# Patient Record
Sex: FEMALE | Race: WHITE | NOT HISPANIC OR LATINO | Employment: FULL TIME | URBAN - METROPOLITAN AREA
[De-identification: names, ages, dates, MRNs, and addresses within clinical notes are randomized per-mention and may not be internally consistent; named-entity substitution may affect disease eponyms.]

---

## 2017-06-20 ENCOUNTER — TELEPHONE (OUTPATIENT)
Dept: OBSTETRICS AND GYNECOLOGY | Facility: CLINIC | Age: 57
End: 2017-06-20

## 2017-06-20 DIAGNOSIS — R10.2 PELVIC PAIN IN FEMALE: Primary | ICD-10-CM

## 2017-06-20 NOTE — TELEPHONE ENCOUNTER
Dr. Mitchell-- pt is experiencing pain that she thinks is coming from her ovaries. Pt's # [phone]

## 2017-06-23 ENCOUNTER — TELEPHONE (OUTPATIENT)
Dept: OBSTETRICS AND GYNECOLOGY | Facility: CLINIC | Age: 57
End: 2017-06-23

## 2017-06-26 ENCOUNTER — OFFICE VISIT (OUTPATIENT)
Dept: OBSTETRICS AND GYNECOLOGY | Facility: CLINIC | Age: 57
End: 2017-06-26
Attending: OBSTETRICS & GYNECOLOGY
Payer: COMMERCIAL

## 2017-06-26 VITALS
HEIGHT: 66 IN | BODY MASS INDEX: 23.24 KG/M2 | DIASTOLIC BLOOD PRESSURE: 84 MMHG | WEIGHT: 144.63 LBS | SYSTOLIC BLOOD PRESSURE: 136 MMHG

## 2017-06-26 DIAGNOSIS — Z00.00 PERIODIC HEALTH ASSESSMENT, GENERAL SCREENING, ADULT: ICD-10-CM

## 2017-06-26 DIAGNOSIS — Z12.11 SCREEN FOR COLON CANCER: ICD-10-CM

## 2017-06-26 DIAGNOSIS — R10.32 LLQ PAIN: Primary | ICD-10-CM

## 2017-06-26 LAB
CTP QC/QA: YES
FECAL OCCULT BLOOD, POC: NEGATIVE

## 2017-06-26 PROCEDURE — 82270 OCCULT BLOOD FECES: CPT | Mod: S$GLB,,, | Performed by: OBSTETRICS & GYNECOLOGY

## 2017-06-26 PROCEDURE — 99214 OFFICE O/P EST MOD 30 MIN: CPT | Mod: S$GLB,,, | Performed by: OBSTETRICS & GYNECOLOGY

## 2017-06-26 PROCEDURE — 99999 PR PBB SHADOW E&M-EST. PATIENT-LVL III: CPT | Mod: PBBFAC,,, | Performed by: OBSTETRICS & GYNECOLOGY

## 2017-06-26 RX ORDER — VARENICLINE TARTRATE 0.5 (11)-1
KIT ORAL
COMMUNITY
Start: 2016-02-17 | End: 2017-07-25

## 2017-06-26 NOTE — PROGRESS NOTES
Pt is 57 y.o. here for evaluation of pelvic pain. The pain is described as cramping, and is 4/10 in intensity when it comes. Pain is located in the LLQ area without radiation. Onset was gradual occurring 4 weeks ago. Symptoms have been gradually improving since. Aggravating factors: none. Alleviating factors: none. Associated symptoms: none. The patient denies constipation, diarrhea, fever, hematochezia, hematuria, nausea and vomiting. Risk factors for pelvic/abdominal pain include prior pelvic surgery. No LMP recorded. Patient is postmenopausal.  Occasionally sexually active, last time 4 months ago.   and no condoms.  Monogamous.  No change in physical activity.    Transabdominal and transvaginal pelvic ultrasound (17):  he uterus is normal in size measuring 7.6 x 3.4 x 3.7 cm.  The endometrial stripe does not appear thickened measuring 3 mm.  No abnormal uterine masses are identified.  The ovaries were not visualized on either side.  No free fluid is identified within the pelvis.   Impression       Unremarkable sonographic appearance of the uterus.  The ovaries were not visualized on either side.  No abnormal pelvic masses are appreciated.         OB History    Para Term  AB Living   1 1 1         SAB TAB Ectopic Multiple Live Births                  # Outcome Date GA Lbr Gómez/2nd Weight Sex Delivery Anes PTL Lv   1 Term  40w0d  4.649 kg (10 lb 4 oz) M CS-Unspec           History reviewed. No pertinent past medical history.  History reviewed. No pertinent surgical history.  Family History   Problem Relation Age of Onset    Breast cancer Paternal Grandmother 65    Coronary artery disease Father     Hyperlipidemia Father     Thyroid disease Sister     Thyroid disease Sister     Ovarian cancer Paternal Aunt 65    Colon cancer Neg Hx     Diabetes Neg Hx     Hypertension Neg Hx     Stroke Neg Hx      Social History     Social History    Marital status:      Spouse name:  "N/A    Number of children: N/A    Years of education: N/A     Social History Main Topics    Smoking status: Current Every Day Smoker    Smokeless tobacco: Never Used    Alcohol use No    Drug use: No    Sexual activity: Not Currently     Birth control/ protection: Post-menopausal     Other Topics Concern    None     Social History Narrative    None     Review of patient's allergies indicates:  No Known Allergies  Current Outpatient Prescriptions on File Prior to Visit   Medication Sig Dispense Refill    [DISCONTINUED] azelastine (ASTELIN) 137 mcg nasal spray 1 spray (137 mcg total) by Nasal route 2 (two) times daily. 30 mL 11     No current facility-administered medications on file prior to visit.        Review of Systems:  General: No fever, chills, fatigue or weight loss.  Chest: No chest pain, shortness of breath, or palpitations.  Breast: No pain, masses, or nipple discharge.  Vulva: No pain, lesions, or itching.  Vagina: No relaxation, pain, itching, discharge, or lesions.  Abdomen: No nausea, vomiting, diarrhea, or constipation.  Urinary: No incontinence, nocturia, frequency, or dysuria.  Extremities:  No leg cramps, edema, or calf pain.  Neurologic: No headaches, dizziness, or visual changes.    Vitals:  Vitals:    06/26/17 0957   BP: 136/84   Weight: 65.6 kg (144 lb 10 oz)   Height: 5' 6" (1.676 m)   PainSc: 0-No pain     Body mass index is 23.34 kg/m².    Physical Exam:  General:  Well developed, well nourished, and in no apparent distress.  HEENT:  Normal sclera.  No thyromegaly.  Abdomen:  Soft, nontender, nondistended, positive bowel sounds, no rebound or guarding.  External genitalia:  No lesions, erythema, rash, or other abnormalities.  Urethra:  No lesions or discharge.   Vagina:  No lesions, discharge, or tenderness.  Cervix:  No lesions, discharge, or cervical motion tenderness.   Uterus:  Normal in size and shape.  Mobile and nontender.   Adnexa:  No masses or tenderness.  Rectal:  No " mass.  Heme negative    Assessment and Plan:  LLQ pain  -     Ambulatory Referral to Gastroenterology  -     POCT OCCULT BLOOD STOOL    Screen for colon cancer  -     POCT OCCULT BLOOD STOOL    Periodic health assessment, general screening, adult  -     Ambulatory Referral to Internal Medicine      U/S and history reviewed with patient.  Differential diagnosis discussed.  No ovarian origin.  Follow-up for annual exam and to discuss pain in 1 month.  She says she doesn't need pain medication because it comes and goes and is not bad right now.

## 2017-07-11 ENCOUNTER — OFFICE VISIT (OUTPATIENT)
Dept: GASTROENTEROLOGY | Facility: CLINIC | Age: 57
End: 2017-07-11
Payer: COMMERCIAL

## 2017-07-11 ENCOUNTER — TELEPHONE (OUTPATIENT)
Dept: ENDOSCOPY | Facility: HOSPITAL | Age: 57
End: 2017-07-11

## 2017-07-11 VITALS
DIASTOLIC BLOOD PRESSURE: 96 MMHG | BODY MASS INDEX: 23.13 KG/M2 | SYSTOLIC BLOOD PRESSURE: 146 MMHG | HEART RATE: 79 BPM | WEIGHT: 143.94 LBS | HEIGHT: 66 IN

## 2017-07-11 DIAGNOSIS — Z12.11 SPECIAL SCREENING FOR MALIGNANT NEOPLASMS, COLON: Primary | ICD-10-CM

## 2017-07-11 DIAGNOSIS — Z12.11 COLON CANCER SCREENING: Primary | ICD-10-CM

## 2017-07-11 PROCEDURE — 99202 OFFICE O/P NEW SF 15 MIN: CPT | Mod: S$GLB,,, | Performed by: PHYSICIAN ASSISTANT

## 2017-07-11 PROCEDURE — 99999 PR PBB SHADOW E&M-EST. PATIENT-LVL III: CPT | Mod: PBBFAC,,, | Performed by: PHYSICIAN ASSISTANT

## 2017-07-11 RX ORDER — POLYETHYLENE GLYCOL 3350, SODIUM SULFATE ANHYDROUS, SODIUM BICARBONATE, SODIUM CHLORIDE, POTASSIUM CHLORIDE 236; 22.74; 6.74; 5.86; 2.97 G/4L; G/4L; G/4L; G/4L; G/4L
4 POWDER, FOR SOLUTION ORAL ONCE
Qty: 4000 ML | Refills: 0 | Status: SHIPPED | OUTPATIENT
Start: 2017-07-11 | End: 2017-07-11

## 2017-07-11 NOTE — PROGRESS NOTES
[facility] Consultation Note    Reason for Consult:  The encounter diagnosis was Colon cancer screening.    PCP:   Primary Doctor No       Referring MD:  Aaareferral Self  No address on file    HPI:  This is a 57 y.o. female here to schedule a colonoscopy  Today she has no complaints. She denies abdominal pain, nausea, vomiting, GERD, dysphagia, constipation, diarrhea, BRBPR, or melena. No prior colonoscopy.    Recently seen by her GYN for mild LLQ pain for  Today she denies abdominal pain and since her office GYN visit. F/u pelvic US was normal.  Referral was made by her GYN for LLQ pain.     ROS:  Constitutional: No fevers, chills, No weight loss  ENT: No allergies  CV: No chest pain  Pulm: No cough, No shortness of breath  Ophtho: No vision changes  GI: see HPI  Derm: No rash  Heme: No lymphadenopathy, No bruising  MSK: No arthritis  : No dysuria, No hematuria  Endo: No hot or cold intolerance  Neuro: No syncope, No seizure  Psych: No anxiety, No depression    Medical History:  has no past medical history on file.    Surgical History:  has a past surgical history that includes  section.    Family History: family history includes Breast cancer (age of onset: 65) in her paternal grandmother; Coronary artery disease in her father; Hyperlipidemia in her father; Ovarian cancer (age of onset: 65) in her paternal aunt; Thyroid disease in her sister and sister..     Social History:  reports that she has been smoking.  She has never used smokeless tobacco. She reports that she does not drink alcohol or use drugs.    Review of patient's allergies indicates:  No Known Allergies    Current Outpatient Prescriptions on File Prior to Visit   Medication Sig Dispense Refill    varenicline (CHANTIX JOSE) 0.5 mg (11)- 1 mg (42) tablet use as directed       No current facility-administered medications on file prior to visit.          Objective Findings:    Vital Signs:  BP (!) 146/96   Pulse 79    "Ht 5' 6" (1.676 m)   Wt 65.3 kg (143 lb 15.4 oz)   BMI 23.24 kg/m²   Body mass index is 23.24 kg/m².    Physical Exam:  General Appearance: Well appearing in no acute distress  Head:   Normocephalic, without obvious abnormality  Eyes:    No scleral icterus  ENT: Neck supple, Lips, mucosa, and tongue normal  Lungs: CTA bilaterally in anterior and posterior fields, no wheezes, no crackles.  Heart:  Regular rate and rhythm, S1, S2 normal, no murmurs heard  Abdomen: Soft, non tender, non distended with positive bowel sounds in all four quadrants. No hepatosplenomegaly, ascites, or mass  Extremities: 2+ pulses, no clubbing, cyanosis or edema  Skin: No rash  Neurologic: AAO x 3      Labs:  Lab Results   Component Value Date    WBC 5.97 02/18/2004    HGB 15.0 02/18/2004    HCT 44.2 02/18/2004     02/18/2004    CHOL 197 02/18/2004    TRIG 98 02/18/2004    HDL 49.0 02/18/2004    ALT 10 02/18/2004    AST 18 02/18/2004     02/18/2004    K 4.3 02/18/2004     02/18/2004    CREATININE 0.7 02/18/2004    BUN 12 02/18/2004    CO2 28 02/18/2004    TSH 0.64 02/18/2004       Imaging:    Endoscopy:      Assessment:  1. Colon cancer screening        Recommendations:  1. Schedule colonoscopy to rule out malignancies    I discussed with the pt the risk of perforation with colonoscopy after diverticulitis. I understand diverticulitis was never diagnosed, how mireille it is a possible source of LLQ pain. She stated that she understood this and again reinforced that she no longer has abdominal pain, and the pain she had was mild. No associated symptoms. She elected to proceed with scheduling the colonoscopy. Her insurance coverage is ending in a few weeks.    No Follow-up on file.      Order summary:  Orders Placed This Encounter    Case request GI: COLONOSCOPY         Thank you so much for allowing me to participate in the care of Catia Adrian Rushing PA-C        "

## 2017-07-12 ENCOUNTER — PATIENT OUTREACH (OUTPATIENT)
Dept: ADMINISTRATIVE | Facility: HOSPITAL | Age: 57
End: 2017-07-12

## 2017-07-20 ENCOUNTER — TELEPHONE (OUTPATIENT)
Dept: INTERNAL MEDICINE | Facility: CLINIC | Age: 57
End: 2017-07-20

## 2017-07-20 ENCOUNTER — TELEPHONE (OUTPATIENT)
Dept: OBSTETRICS AND GYNECOLOGY | Facility: CLINIC | Age: 57
End: 2017-07-20

## 2017-07-20 DIAGNOSIS — Z12.39 SCREENING FOR BREAST CANCER: Primary | ICD-10-CM

## 2017-07-20 NOTE — TELEPHONE ENCOUNTER
Dr Mitchell pt coming in on 7-31-17 for her annual but wants to get her mammo before the 31st cause she is losing her insurance.if possible please fax orders to [facility] and call pt either way if this can be done.Pt # [phone] or [phone]

## 2017-07-20 NOTE — TELEPHONE ENCOUNTER
Pt insurance will term on 7/31/17 and pt is requesting mammogram orders prior to her appointment with Dr. Mitchell. Informed pt that normal mammogram orders are no longer given out prior to appointment because Dr. Mitchell does a breast exam at time of visit and based off of that breast exam determines what orders are used. Advised pt that I will make an exception this once for her due to the situation but they may need additional images if these are not the correct orders needed for pt. Pt verbalized understanding. Orders created and faxed.

## 2017-07-20 NOTE — TELEPHONE ENCOUNTER
----- Message from Leena Case sent at 7/20/2017  9:29 AM CDT -----  _  1st Request  _  2nd Request  _  3rd Request        Who: patient    Why: pt called because she received a letter from your office to call and schedule some test before her appt on 7/25/17. Please call pt, also her insurance run out at the end month    What Number to Call Back: [phone] or [phone]    When to Expect a call back: (With in 24 hours)

## 2017-07-20 NOTE — TELEPHONE ENCOUNTER
Pt has appt on 7/25 to establish care. Please advise if pt needs any test or labs done before appt. Pt received message stating she needed a test or lab done before appt and I do not see any orders. Please advise and authorize.

## 2017-07-25 ENCOUNTER — OFFICE VISIT (OUTPATIENT)
Dept: INTERNAL MEDICINE | Facility: CLINIC | Age: 57
End: 2017-07-25
Payer: COMMERCIAL

## 2017-07-25 ENCOUNTER — LAB VISIT (OUTPATIENT)
Dept: LAB | Facility: OTHER | Age: 57
End: 2017-07-25
Attending: INTERNAL MEDICINE
Payer: COMMERCIAL

## 2017-07-25 VITALS
BODY MASS INDEX: 23.2 KG/M2 | OXYGEN SATURATION: 99 % | HEIGHT: 66 IN | HEART RATE: 70 BPM | DIASTOLIC BLOOD PRESSURE: 78 MMHG | WEIGHT: 144.38 LBS | SYSTOLIC BLOOD PRESSURE: 132 MMHG

## 2017-07-25 DIAGNOSIS — Z00.00 ANNUAL PHYSICAL EXAM: ICD-10-CM

## 2017-07-25 DIAGNOSIS — R03.0 PREHYPERTENSION: ICD-10-CM

## 2017-07-25 DIAGNOSIS — Z00.00 ANNUAL PHYSICAL EXAM: Primary | ICD-10-CM

## 2017-07-25 DIAGNOSIS — Z72.0 TOBACCO USE: ICD-10-CM

## 2017-07-25 LAB
ALBUMIN SERPL BCP-MCNC: 4.1 G/DL
ALP SERPL-CCNC: 103 U/L
ALT SERPL W/O P-5'-P-CCNC: 12 U/L
ANION GAP SERPL CALC-SCNC: 12 MMOL/L
AST SERPL-CCNC: 18 U/L
BASOPHILS # BLD AUTO: 0.02 K/UL
BASOPHILS NFR BLD: 0.6 %
BILIRUB SERPL-MCNC: 0.6 MG/DL
BUN SERPL-MCNC: 14 MG/DL
CALCIUM SERPL-MCNC: 9.9 MG/DL
CHLORIDE SERPL-SCNC: 103 MMOL/L
CHOLEST/HDLC SERPL: 4.8 {RATIO}
CO2 SERPL-SCNC: 25 MMOL/L
CREAT SERPL-MCNC: 0.7 MG/DL
DIFFERENTIAL METHOD: ABNORMAL
EOSINOPHIL # BLD AUTO: 0.1 K/UL
EOSINOPHIL NFR BLD: 1.8 %
ERYTHROCYTE [DISTWIDTH] IN BLOOD BY AUTOMATED COUNT: 12.6 %
EST. GFR  (AFRICAN AMERICAN): >60 ML/MIN/1.73 M^2
EST. GFR  (NON AFRICAN AMERICAN): >60 ML/MIN/1.73 M^2
GLUCOSE SERPL-MCNC: 98 MG/DL
HCT VFR BLD AUTO: 46 %
HCV AB SERPL QL IA: NEGATIVE
HDL/CHOLESTEROL RATIO: 21 %
HDLC SERPL-MCNC: 243 MG/DL
HDLC SERPL-MCNC: 51 MG/DL
HGB BLD-MCNC: 15.2 G/DL
LDLC SERPL CALC-MCNC: 169.4 MG/DL
LYMPHOCYTES # BLD AUTO: 1.2 K/UL
LYMPHOCYTES NFR BLD: 34.2 %
MCH RBC QN AUTO: 29.3 PG
MCHC RBC AUTO-ENTMCNC: 33 G/DL
MCV RBC AUTO: 89 FL
MONOCYTES # BLD AUTO: 0.3 K/UL
MONOCYTES NFR BLD: 8 %
NEUTROPHILS # BLD AUTO: 1.9 K/UL
NEUTROPHILS NFR BLD: 55.1 %
NONHDLC SERPL-MCNC: 192 MG/DL
PLATELET # BLD AUTO: 221 K/UL
PMV BLD AUTO: 11.7 FL
POTASSIUM SERPL-SCNC: 4.1 MMOL/L
PROT SERPL-MCNC: 7.6 G/DL
RBC # BLD AUTO: 5.19 M/UL
SODIUM SERPL-SCNC: 140 MMOL/L
TRIGL SERPL-MCNC: 113 MG/DL
TSH SERPL DL<=0.005 MIU/L-ACNC: 2.08 UIU/ML
WBC # BLD AUTO: 3.36 K/UL

## 2017-07-25 PROCEDURE — 90472 IMMUNIZATION ADMIN EACH ADD: CPT | Mod: S$GLB,,, | Performed by: INTERNAL MEDICINE

## 2017-07-25 PROCEDURE — 99386 PREV VISIT NEW AGE 40-64: CPT | Mod: 25,S$GLB,, | Performed by: INTERNAL MEDICINE

## 2017-07-25 PROCEDURE — 99999 PR PBB SHADOW E&M-EST. PATIENT-LVL IV: CPT | Mod: PBBFAC,,, | Performed by: INTERNAL MEDICINE

## 2017-07-25 PROCEDURE — 86803 HEPATITIS C AB TEST: CPT

## 2017-07-25 PROCEDURE — 90732 PPSV23 VACC 2 YRS+ SUBQ/IM: CPT | Mod: S$GLB,,, | Performed by: INTERNAL MEDICINE

## 2017-07-25 PROCEDURE — 90715 TDAP VACCINE 7 YRS/> IM: CPT | Mod: S$GLB,,, | Performed by: INTERNAL MEDICINE

## 2017-07-25 PROCEDURE — 80061 LIPID PANEL: CPT

## 2017-07-25 PROCEDURE — 90471 IMMUNIZATION ADMIN: CPT | Mod: S$GLB,,, | Performed by: INTERNAL MEDICINE

## 2017-07-25 PROCEDURE — 85025 COMPLETE CBC W/AUTO DIFF WBC: CPT

## 2017-07-25 PROCEDURE — 84443 ASSAY THYROID STIM HORMONE: CPT

## 2017-07-25 PROCEDURE — 83036 HEMOGLOBIN GLYCOSYLATED A1C: CPT

## 2017-07-25 PROCEDURE — 36415 COLL VENOUS BLD VENIPUNCTURE: CPT

## 2017-07-25 PROCEDURE — 80053 COMPREHEN METABOLIC PANEL: CPT

## 2017-07-25 NOTE — PROGRESS NOTES
Patient given TDAP IM in the LD. Patient tolerated well and Band-Aid was applied. Lot#[id number] Exp:04/11/2019. Patient advised to wait in the lobby for 15 min to make sure no adverse reactions occur. Patient given VIS information sheet.Patient states verbal understanding and has no further questions.Patient given Pneumovax 23 Im in the RD. Patient tolerated well and Band-Aid was applied. Lot#[id number] Exp:11/15/2018. Patient advised to wait in the lobby for 15 min to make sure no adverse reactions occur.Patient given VIS information sheet. Patient states verbal understanding and has no further questions.'

## 2017-07-25 NOTE — PROGRESS NOTES
"Subjective:       Patient ID: Catia Campbell is a 57 y.o. female.    Chief Complaint: Annual Exam      New pt.     Here to establish care.      Feeling well.  No c/o.      She is looking to quit smoking.  Quit in the past w/Chantix - approx 2 yrs ago.     She was laid off of her job 2 weeks ago.     She is active, such as with gardening, but does not exercise.           Review of Systems   Constitutional: Negative.    HENT: Negative.    Eyes: Negative.    Respiratory: Negative.        History reviewed. No pertinent past medical history.    Past Surgical History:   Procedure Laterality Date     SECTION         Family History   Problem Relation Age of Onset    Breast cancer Paternal Grandmother 65    Coronary artery disease Father     Hyperlipidemia Father     Heart disease Father     Cancer Mother     Thyroid disease Sister     Thyroid disease Sister     Ovarian cancer Paternal Aunt 65    Colon cancer Neg Hx     Diabetes Neg Hx     Hypertension Neg Hx     Stroke Neg Hx        Social History     Social History    Marital status:      Spouse name: N/A    Number of children: N/A    Years of education: N/A     Occupational History          Social History Main Topics    Smoking status: Current Every Day Smoker    Smokeless tobacco: Never Used    Alcohol use No    Drug use: No    Sexual activity: Not Currently     Birth control/ protection: Post-menopausal     Other Topics Concern    None     Social History Narrative    Lives w/.  Has one adult son.       Objective:       Vitals:    17 0927   BP: 132/78   BP Location: Left arm   Patient Position: Sitting   BP Method: Manual   Pulse: 70   SpO2: 99%   Weight: 65.5 kg (144 lb 6.4 oz)   Height: 5' 6" (1.676 m)     Physical Exam   Constitutional: She is oriented to person, place, and time. She appears well-developed and well-nourished.   HENT:   Head: Normocephalic and atraumatic.   Right Ear: Tympanic " membrane, external ear and ear canal normal.   Left Ear: Tympanic membrane, external ear and ear canal normal.   Mouth/Throat: Oropharynx is clear and moist. No oropharyngeal exudate or posterior oropharyngeal erythema.   Eyes: Conjunctivae and EOM are normal. Pupils are equal, round, and reactive to light.   Neck: Normal range of motion. Neck supple. No thyromegaly present.   Cardiovascular: Normal rate, regular rhythm and normal heart sounds.  Exam reveals no gallop and no friction rub.    No murmur heard.  Pulmonary/Chest: Effort normal and breath sounds normal. She has no wheezes. She has no rales.   Abdominal: Soft. Bowel sounds are normal. She exhibits no distension. There is no tenderness. There is no rebound and no guarding.   Musculoskeletal: Normal range of motion. She exhibits no edema.   Lymphadenopathy:     She has no cervical adenopathy.   Neurological: She is alert and oriented to person, place, and time. She has normal strength. She displays no atrophy and no tremor. No sensory deficit. She exhibits normal muscle tone. Gait normal.   Skin: Skin is warm and dry. No rash noted.   Psychiatric: She has a normal mood and affect. Her behavior is normal. Thought content normal.   Vitals reviewed.      Assessment:       1. Annual physical exam    2. Prehypertension    3. Tobacco use        Plan:           HM - Update labs.  Tdap today.      Elevated BP - Pt advised regarding lifestyle modifications.  Cont to monitor.      Tobacco - Pt advised to quit.  Pt aware of risks.  Pt is planning to quit and already has Chantix from another doctor.  Pt encouraged to follow through w/this.

## 2017-07-26 ENCOUNTER — TELEPHONE (OUTPATIENT)
Dept: INTERNAL MEDICINE | Facility: CLINIC | Age: 57
End: 2017-07-26

## 2017-07-26 DIAGNOSIS — D70.8 OTHER NEUTROPENIA: Primary | ICD-10-CM

## 2017-07-26 LAB
ESTIMATED AVG GLUCOSE: 105 MG/DL
HBA1C MFR BLD HPLC: 5.3 %

## 2017-07-27 NOTE — TELEPHONE ENCOUNTER
Inform pt of her lab results and that she needs to do repeat labs. Pt agrees and verbalize and has no further questions at this time. Pt also states that she got laid off on her job and she will no longer have insurance as of Monday and she will soon be added to her  insurance and will contact office back to schedule repeat labs.

## 2017-07-28 ENCOUNTER — HOSPITAL ENCOUNTER (OUTPATIENT)
Facility: HOSPITAL | Age: 57
Discharge: HOME | End: 2017-07-28
Attending: INTERNAL MEDICINE | Admitting: INTERNAL MEDICINE
Payer: COMMERCIAL

## 2017-07-28 ENCOUNTER — SURGERY (OUTPATIENT)
Age: 57
End: 2017-07-28

## 2017-07-28 ENCOUNTER — ANESTHESIA EVENT (OUTPATIENT)
Dept: ENDOSCOPY | Facility: HOSPITAL | Age: 57
End: 2017-07-28
Payer: COMMERCIAL

## 2017-07-28 ENCOUNTER — ANESTHESIA (OUTPATIENT)
Dept: ENDOSCOPY | Facility: HOSPITAL | Age: 57
End: 2017-07-28
Payer: COMMERCIAL

## 2017-07-28 VITALS
HEART RATE: 60 BPM | DIASTOLIC BLOOD PRESSURE: 79 MMHG | SYSTOLIC BLOOD PRESSURE: 118 MMHG | OXYGEN SATURATION: 96 % | WEIGHT: 140 LBS | HEIGHT: 66 IN | BODY MASS INDEX: 22.5 KG/M2 | TEMPERATURE: 98 F | RESPIRATION RATE: 18 BRPM

## 2017-07-28 DIAGNOSIS — Z12.11 COLON CANCER SCREENING: Primary | ICD-10-CM

## 2017-07-28 DIAGNOSIS — Z12.11 SCREENING FOR COLON CANCER: ICD-10-CM

## 2017-07-28 PROCEDURE — 37000008 HC ANESTHESIA 1ST 15 MINUTES: Performed by: INTERNAL MEDICINE

## 2017-07-28 PROCEDURE — 37000009 HC ANESTHESIA EA ADD 15 MINS: Performed by: INTERNAL MEDICINE

## 2017-07-28 PROCEDURE — 45380 COLONOSCOPY AND BIOPSY: CPT | Mod: 33,,, | Performed by: INTERNAL MEDICINE

## 2017-07-28 PROCEDURE — D9220A PRA ANESTHESIA: Mod: 33,ANES,, | Performed by: ANESTHESIOLOGY

## 2017-07-28 PROCEDURE — 63600175 PHARM REV CODE 636 W HCPCS: Performed by: NURSE ANESTHETIST, CERTIFIED REGISTERED

## 2017-07-28 PROCEDURE — 88305 TISSUE EXAM BY PATHOLOGIST: CPT | Mod: 26,,, | Performed by: PATHOLOGY

## 2017-07-28 PROCEDURE — 45380 COLONOSCOPY AND BIOPSY: CPT | Performed by: INTERNAL MEDICINE

## 2017-07-28 PROCEDURE — D9220A PRA ANESTHESIA: Mod: 33,CRNA,, | Performed by: NURSE ANESTHETIST, CERTIFIED REGISTERED

## 2017-07-28 PROCEDURE — 25000003 PHARM REV CODE 250: Performed by: NURSE ANESTHETIST, CERTIFIED REGISTERED

## 2017-07-28 PROCEDURE — 27201012 HC FORCEPS, HOT/COLD, DISP: Performed by: INTERNAL MEDICINE

## 2017-07-28 PROCEDURE — 25000003 PHARM REV CODE 250: Performed by: INTERNAL MEDICINE

## 2017-07-28 PROCEDURE — 88305 TISSUE EXAM BY PATHOLOGIST: CPT | Performed by: PATHOLOGY

## 2017-07-28 RX ORDER — PROPOFOL 10 MG/ML
VIAL (ML) INTRAVENOUS
Status: DISCONTINUED | OUTPATIENT
Start: 2017-07-28 | End: 2017-07-28

## 2017-07-28 RX ORDER — PROPOFOL 10 MG/ML
VIAL (ML) INTRAVENOUS CONTINUOUS PRN
Status: DISCONTINUED | OUTPATIENT
Start: 2017-07-28 | End: 2017-07-28

## 2017-07-28 RX ORDER — SODIUM CHLORIDE 9 MG/ML
INJECTION, SOLUTION INTRAVENOUS CONTINUOUS PRN
Status: DISCONTINUED | OUTPATIENT
Start: 2017-07-28 | End: 2017-07-28

## 2017-07-28 RX ORDER — LIDOCAINE HYDROCHLORIDE 10 MG/ML
INJECTION, SOLUTION INTRAVENOUS
Status: DISCONTINUED | OUTPATIENT
Start: 2017-07-28 | End: 2017-07-28

## 2017-07-28 RX ORDER — SODIUM CHLORIDE 9 MG/ML
INJECTION, SOLUTION INTRAVENOUS CONTINUOUS
Status: DISCONTINUED | OUTPATIENT
Start: 2017-07-28 | End: 2017-07-28 | Stop reason: HOSPADM

## 2017-07-28 RX ADMIN — LIDOCAINE HYDROCHLORIDE 100 MG: 10 INJECTION, SOLUTION INTRAVENOUS at 01:07

## 2017-07-28 RX ADMIN — SODIUM CHLORIDE: 0.9 INJECTION, SOLUTION INTRAVENOUS at 01:07

## 2017-07-28 RX ADMIN — PROPOFOL 60 MG: 10 INJECTION, EMULSION INTRAVENOUS at 01:07

## 2017-07-28 RX ADMIN — PROPOFOL 150 MCG/KG/MIN: 10 INJECTION, EMULSION INTRAVENOUS at 01:07

## 2017-07-28 NOTE — PLAN OF CARE
Discharge instructions given to pt and pts . Both verbalize understanding. No questions at this time.

## 2017-07-28 NOTE — TRANSFER OF CARE
"Anesthesia Transfer of Care Note    Patient: Catia Campbell    Procedure(s) Performed: Procedure(s) (LRB):  COLONOSCOPY (N/A)    Patient location: PACU    Anesthesia Type: general    Transport from OR: Transported from OR on room air with adequate spontaneous ventilation    Post pain: adequate analgesia    Post assessment: no apparent anesthetic complications and tolerated procedure well    Post vital signs: stable    Level of consciousness: awake and alert    Nausea/Vomiting: no nausea/vomiting    Complications: none    Transfer of care protocol was followed      Last vitals:   Visit Vitals  BP (!) 106/59 (BP Location: Left arm, Patient Position: Lying, BP Method: Automatic)   Pulse 69   Temp 36.7 °C (98 °F) (Axillary)   Resp 16   Ht 5' 6" (1.676 m)   Wt 63.5 kg (140 lb)   SpO2 95%   Breastfeeding? No   BMI 22.60 kg/m²     "

## 2017-07-28 NOTE — ANESTHESIA PREPROCEDURE EVALUATION
07/28/2017  Catia Campbell is a 57 y.o., female.    Anesthesia Evaluation    I have reviewed the Patient Summary Reports.    I have reviewed the Nursing Notes.      Review of Systems  Anesthesia Hx:  No problems with previous Anesthesia    Social:  Smoker    Cardiovascular:  Cardiovascular Normal     Pulmonary:  Pulmonary Normal        Physical Exam  General:  Well nourished    Airway/Jaw/Neck:  Airway Findings: Mouth Opening: Normal Tongue: Normal  Mallampati: II  TM Distance: Normal, at least 6 cm  Jaw/Neck Findings:  Neck ROM: Normal ROM     Eyes/Ears/Nose:  Eyes/Ears/Nose Findings:    Dental:  Dental Findings:   Chest/Lungs:  Chest/Lungs Findings: Normal Respiratory Rate     Heart/Vascular:  Heart Findings: Rate: Normal  Rhythm: Regular Rhythm        Mental Status:  Mental Status Findings:  Cooperative, Alert and Oriented         Anesthesia Plan  Type of Anesthesia, risks & benefits discussed:  Anesthesia Type:  general  Patient's Preference: general  Intra-op Monitoring Plan: standard ASA monitors  Intra-op Monitoring Plan Comments:   Post Op Pain Control Plan:   Post Op Pain Control Plan Comments:   Induction:   IV  Beta Blocker:  Patient is not currently on a Beta-Blocker (No further documentation required).       Informed Consent: Patient understands risks and agrees with Anesthesia plan.  Questions answered. Anesthesia consent signed with patient.  ASA Score: 2     Day of Surgery Review of History & Physical:    H&P update referred to the surgeon.         Ready For Surgery From Anesthesia Perspective.

## 2017-07-28 NOTE — ANESTHESIA POSTPROCEDURE EVALUATION
"Anesthesia Post Evaluation    Patient: Catia Campbell    Procedure(s) Performed: Procedure(s) (LRB):  COLONOSCOPY (N/A)    Final Anesthesia Type: general  Patient location during evaluation: GI PACU  Patient participation: Yes- Able to Participate  Level of consciousness: awake and alert, oriented and awake  Post-procedure vital signs: reviewed and stable  Pain management: adequate  Airway patency: patent  PONV status at discharge: No PONV  Anesthetic complications: no      Cardiovascular status: blood pressure returned to baseline and hemodynamically stable  Respiratory status: unassisted, spontaneous ventilation and room air  Hydration status: euvolemic  Follow-up not needed.        Visit Vitals  /79 (BP Location: Left arm, Patient Position: Lying, BP Method: Automatic)   Pulse 60   Temp 36.7 °C (98 °F) (Axillary)   Resp 18   Ht 5' 6" (1.676 m)   Wt 63.5 kg (140 lb)   SpO2 96%   Breastfeeding? No   BMI 22.60 kg/m²       Pain/Joselin Score: Pain Assessment Performed: Yes (7/28/2017  2:45 PM)  Presence of Pain: denies (7/28/2017  2:45 PM)  Joselin Score: 10 (7/28/2017  2:45 PM)      "

## 2017-07-28 NOTE — INTERVAL H&P NOTE
The patient has been examined and the H&P has been reviewed:    There is no interval changes since last encounter.  Colonoscopy: Screening for CRC  Sedation: GA  ASA: Per anesthesia  Mallampati: Per anesthesia    Endoscopy risks, benefits and alternative options discussed and understood by patient/family.          Active Hospital Problems    Diagnosis  POA    Screening for colon cancer [Z12.11]  Not Applicable      Resolved Hospital Problems    Diagnosis Date Resolved POA   No resolved problems to display.

## 2017-07-28 NOTE — H&P (VIEW-ONLY)
[facility] Consultation Note    Reason for Consult:  The encounter diagnosis was Colon cancer screening.    PCP:   Primary Doctor No       Referring MD:  Aaareferral Self  No address on file    HPI:  This is a 57 y.o. female here to schedule a colonoscopy  Today she has no complaints. She denies abdominal pain, nausea, vomiting, GERD, dysphagia, constipation, diarrhea, BRBPR, or melena. No prior colonoscopy.    Recently seen by her GYN for mild LLQ pain for  Today she denies abdominal pain and since her office GYN visit. F/u pelvic US was normal.  Referral was made by her GYN for LLQ pain.     ROS:  Constitutional: No fevers, chills, No weight loss  ENT: No allergies  CV: No chest pain  Pulm: No cough, No shortness of breath  Ophtho: No vision changes  GI: see HPI  Derm: No rash  Heme: No lymphadenopathy, No bruising  MSK: No arthritis  : No dysuria, No hematuria  Endo: No hot or cold intolerance  Neuro: No syncope, No seizure  Psych: No anxiety, No depression    Medical History:  has no past medical history on file.    Surgical History:  has a past surgical history that includes  section.    Family History: family history includes Breast cancer (age of onset: 65) in her paternal grandmother; Coronary artery disease in her father; Hyperlipidemia in her father; Ovarian cancer (age of onset: 65) in her paternal aunt; Thyroid disease in her sister and sister..     Social History:  reports that she has been smoking.  She has never used smokeless tobacco. She reports that she does not drink alcohol or use drugs.    Review of patient's allergies indicates:  No Known Allergies    Current Outpatient Prescriptions on File Prior to Visit   Medication Sig Dispense Refill    varenicline (CHANTIX JOSE) 0.5 mg (11)- 1 mg (42) tablet use as directed       No current facility-administered medications on file prior to visit.          Objective Findings:    Vital Signs:  BP (!) 146/96   Pulse 79    "Ht 5' 6" (1.676 m)   Wt 65.3 kg (143 lb 15.4 oz)   BMI 23.24 kg/m²   Body mass index is 23.24 kg/m².    Physical Exam:  General Appearance: Well appearing in no acute distress  Head:   Normocephalic, without obvious abnormality  Eyes:    No scleral icterus  ENT: Neck supple, Lips, mucosa, and tongue normal  Lungs: CTA bilaterally in anterior and posterior fields, no wheezes, no crackles.  Heart:  Regular rate and rhythm, S1, S2 normal, no murmurs heard  Abdomen: Soft, non tender, non distended with positive bowel sounds in all four quadrants. No hepatosplenomegaly, ascites, or mass  Extremities: 2+ pulses, no clubbing, cyanosis or edema  Skin: No rash  Neurologic: AAO x 3      Labs:  Lab Results   Component Value Date    WBC 5.97 02/18/2004    HGB 15.0 02/18/2004    HCT 44.2 02/18/2004     02/18/2004    CHOL 197 02/18/2004    TRIG 98 02/18/2004    HDL 49.0 02/18/2004    ALT 10 02/18/2004    AST 18 02/18/2004     02/18/2004    K 4.3 02/18/2004     02/18/2004    CREATININE 0.7 02/18/2004    BUN 12 02/18/2004    CO2 28 02/18/2004    TSH 0.64 02/18/2004       Imaging:    Endoscopy:      Assessment:  1. Colon cancer screening        Recommendations:  1. Schedule colonoscopy to rule out malignancies    I discussed with the pt the risk of perforation with colonoscopy after diverticulitis. I understand diverticulitis was never diagnosed, how mireille it is a possible source of LLQ pain. She stated that she understood this and again reinforced that she no longer has abdominal pain, and the pain she had was mild. No associated symptoms. She elected to proceed with scheduling the colonoscopy. Her insurance coverage is ending in a few weeks.    No Follow-up on file.      Order summary:  Orders Placed This Encounter    Case request GI: COLONOSCOPY         Thank you so much for allowing me to participate in the care of Actia Adrian Rushing PA-C        "

## 2017-07-28 NOTE — PATIENT INSTRUCTIONS
Discharge Summary/Instructions after an Endoscopic Procedure  Patient Name: Catia Campbell  Patient MRN: [id number]  Patient YOB: 1960 Friday, July 28, 2017  Meño Bustamante MD  RESTRICTIONS ON ACTIVITY:  - DO NOT drive a car, operate machinery, make legal/financial decisions, or   drink alcohol until the day after the procedure.    - The following day: return to full activity including work, except no heavy   lifting, straining or running for 3 days if polyps were removed.  - Diet: Eat and drink normally unless instructed otherwise.  TREATMENT FOR COMMON SIDE EFFECTS:  - Mild abdominal pain, bloating or excessive gas: rest, eat lightly and use   a heating pad.  - Sore Throat - treat with throat lozenges. Gargle with warm salt water.  SYMPTOMS TO WATCH FOR AND REPORT TO YOUR PHYSICIAN:  1. Severe abdominal pain or bloating.  2. Pain in chest.  3. Chills or fever occurring within 24 hours after a procedure.  4. A large amount of rectal bleeding, which would show as bright red,   maroon, or black stools. (A small amount of blood from the rectum is not   serious, especially if hemorrhoids are present.)  5. Because air was used during the procedure, expelling large amounts of air   from your rectum or belching is normal.  6. If a bowel prep was taken, you may not have a bowel movement for 1-3   days.  This is normal.  7. Go directly to the emergency room if you notice any of the following:   Chills and/or fever over 101 F   Persistent vomiting   Severe abdominal pain, other than gas cramps   Severe chest pain   Black, tarry stools   Any bleeding - exceeding one tablespoon  Your doctor recommends these additional instructions:  If any biopsies were performed, my office will call you in 5 to 6 business   days with any results.  You have a contact number available for emergencies.  The signs and symptoms   of potential delayed complications were discussed with you.  You may return   to normal activities  tomorrow.  Written discharge instructions were   provided to you.   You are being discharged to home.   Resume your previous diet.   Continue your present medications.   We are waiting for your pathology results.   Your physician has recommended a repeat colonoscopy in five years for   surveillance.  For questions, problems or results please call your physician - Meño Bustamante MD at Work:  [phone].  [facility], EMERGENCY ROOM PHONE NUMBER: [phone]  IF A COMPLICATION OR EMERGENCY SITUATION ARISES AND YOU ARE UNABLE TO REACH   YOUR PHYSICIAN - GO TO THE EMERGENCY ROOM.  Meño Bustamante MD  7/28/2017 2:20:21 PM  This report has been verified and signed electronically.

## 2017-07-31 ENCOUNTER — OFFICE VISIT (OUTPATIENT)
Dept: OBSTETRICS AND GYNECOLOGY | Facility: CLINIC | Age: 57
End: 2017-07-31
Attending: OBSTETRICS & GYNECOLOGY
Payer: COMMERCIAL

## 2017-07-31 ENCOUNTER — HOSPITAL ENCOUNTER (OUTPATIENT)
Dept: RADIOLOGY | Facility: OTHER | Age: 57
Discharge: HOME | End: 2017-07-31
Attending: OBSTETRICS & GYNECOLOGY
Payer: COMMERCIAL

## 2017-07-31 ENCOUNTER — TELEPHONE (OUTPATIENT)
Dept: OBSTETRICS AND GYNECOLOGY | Facility: CLINIC | Age: 57
End: 2017-07-31

## 2017-07-31 VITALS
SYSTOLIC BLOOD PRESSURE: 142 MMHG | WEIGHT: 143.88 LBS | BODY MASS INDEX: 23.12 KG/M2 | HEIGHT: 66 IN | DIASTOLIC BLOOD PRESSURE: 84 MMHG

## 2017-07-31 DIAGNOSIS — Z78.0 MENOPAUSE: ICD-10-CM

## 2017-07-31 DIAGNOSIS — Z72.0 TOBACCO USE: Primary | ICD-10-CM

## 2017-07-31 DIAGNOSIS — Z01.419 ENCOUNTER FOR GYNECOLOGICAL EXAMINATION: Primary | ICD-10-CM

## 2017-07-31 PROBLEM — R10.32 LLQ PAIN: Status: RESOLVED | Noted: 2017-06-26 | Resolved: 2017-07-31

## 2017-07-31 PROCEDURE — 99999 PR PBB SHADOW E&M-EST. PATIENT-LVL II: CPT | Mod: PBBFAC,,, | Performed by: OBSTETRICS & GYNECOLOGY

## 2017-07-31 PROCEDURE — 77080 DXA BONE DENSITY AXIAL: CPT | Mod: 26,,, | Performed by: RADIOLOGY

## 2017-07-31 PROCEDURE — 99396 PREV VISIT EST AGE 40-64: CPT | Mod: S$GLB,,, | Performed by: OBSTETRICS & GYNECOLOGY

## 2017-07-31 PROCEDURE — 77080 DXA BONE DENSITY AXIAL: CPT | Mod: TC

## 2017-07-31 RX ORDER — VARENICLINE TARTRATE 0.5 (11)-1
KIT ORAL
Qty: 1 PACKAGE | Refills: 0 | Status: SHIPPED | OUTPATIENT
Start: 2017-07-31 | End: 2018-12-14

## 2017-07-31 NOTE — TELEPHONE ENCOUNTER
Pt just saw dr gloria today and was told her Chantix would be sent in today insurance expires after today.Pt needs it to be sent in and please call pt when this is done. Pt # [phone] Jolly # [phone]

## 2017-07-31 NOTE — PROGRESS NOTES
Subjective:       Patient ID: Catia Campbell is a 57 y.o. female.    Chief Complaint:  Well Woman (last pap and HPV negative 1/21/15, last mammogram wnl 17, last DEXA Osteopenia T-Score: Spine -1.31, L hip -2.22 1/21/15, last colonoscopy 17- pt requesting Rx for Chantix)      History of Present Illness.  Catia Campbell is a 57 y.o. female.  She has no breast or urinary symptoms.  She has no postcoital bleeding, pelvic pain or vaginal discharge.    GYN & OB History  No LMP recorded. Patient is postmenopausal.   Pap: 1/21/15 Normal HPV negative  Mammogram: 17 Normal  Colonoscopy: 2017 1 polyp - Path pending  DEXA:  FRAX 13% and 2.2% Hip -2.22 Spine-1.31    OB History    Para Term  AB Living   1 1 1         SAB TAB Ectopic Multiple Live Births                  # Outcome Date GA Lbr Gómez/2nd Weight Sex Delivery Anes PTL Lv   1 Term 82 40w0d  4.649 kg (10 lb 4 oz) M CS-Unspec         Obstetric Comments   Age at menarche 14       Past Medical History:   Diagnosis Date    Fibrocystic breast disease     History of bone density study 2015    Osteopenia T-Score: Spine -1.31, L hip -2.22    Osteopenia      Past Surgical History:   Procedure Laterality Date     SECTION      COLONOSCOPY N/A 2017    Procedure: COLONOSCOPY;  Surgeon: Meño Bustamante MD;  Location: [address];  Service: Endoscopy;  Laterality: N/A;  PM prep     Family History   Problem Relation Age of Onset    Breast cancer Paternal Grandmother 65    Coronary artery disease Father     Hyperlipidemia Father     Heart disease Father     Cancer Mother     Thyroid disease Sister     Thyroid disease Sister     Ovarian cancer Paternal Aunt 65    Colon cancer Neg Hx     Diabetes Neg Hx     Hypertension Neg Hx     Stroke Neg Hx      Social History   Substance Use Topics    Smoking status: Current Every Day Smoker    Smokeless tobacco: Never Used    Alcohol use Yes      Comment:  "rare     No current outpatient prescriptions on file.    Review of patient's allergies indicates:  No Known Allergies    Review of Systems  Review of Systems   Constitutional: Negative for fatigue.   HENT: Negative for trouble swallowing.    Eyes: Negative for visual disturbance.   Respiratory: Negative for cough and shortness of breath.    Cardiovascular: Negative for chest pain.   Gastrointestinal: Negative for abdominal distention, abdominal pain, blood in stool, nausea and vomiting.   Genitourinary: Negative for difficulty urinating, dyspareunia, dysuria, flank pain, frequency, hematuria, pelvic pain, urgency, vaginal bleeding, vaginal discharge and vaginal pain.   Musculoskeletal: Negative for arthralgias.   Skin: Negative for rash.   Neurological: Negative for dizziness and headaches.   Psychiatric/Behavioral: Negative for sleep disturbance. The patient is not nervous/anxious.         Objective:     Vitals:    07/31/17 1002   BP: (!) 142/84   Weight: 65.2 kg (143 lb 13.6 oz)   Height: 5' 6" (1.676 m)   PainSc: 0-No pain     Body mass index is 23.22 kg/m².    Physical Exam:   Constitutional: She is oriented to person, place, and time. Vital signs are normal. She appears well-developed and well-nourished.    HENT:   Head: Normocephalic.     Neck: Normal range of motion. No thyromegaly present.     Pulmonary/Chest: Right breast exhibits no mass, no nipple discharge, no skin change, no tenderness and no swelling. Left breast exhibits no mass, no nipple discharge, no skin change, no tenderness and no swelling. Breasts are symmetrical.        Abdominal: Soft. Normal appearance and bowel sounds are normal. She exhibits no distension. There is no tenderness.     Genitourinary: Vagina normal and uterus normal. Pelvic exam was performed with patient supine. There is no rash, tenderness, lesion or injury on the right labia. There is no rash, tenderness, lesion or injury on the left labia. Cervix is normal. Right adnexum " displays no mass, no tenderness and no fullness. Left adnexum displays no mass, no tenderness and no fullness. No erythema in the vagina. No vaginal discharge found. Cervix exhibits no motion tenderness and no discharge.           Musculoskeletal: Normal range of motion.      Lymphadenopathy:        Right: No inguinal and no supraclavicular adenopathy present.        Left: No inguinal and no supraclavicular adenopathy present.    Neurological: She is alert and oriented to person, place, and time.    Skin: Skin is warm and dry.    Psychiatric: She has a normal mood and affect.        Assessment/ Plan:     Encounter for gynecological examination    Menopause  -     DXA Bone Density Spine And Hip; Future; Expected date: 07/31/2017      Smoking cessation discussed.    Routine pap smears.  Self breast exam and mammography discussed  Routine colonoscopy discussed.  Diet and exercise discussed.  Recommend calcium 1200 mg and vitamin D 600 units daily and routine bone mineral density testing.  Yearly influenza vaccination discussed.  Follow-up with me in 1 year.

## 2017-08-01 ENCOUNTER — TELEPHONE (OUTPATIENT)
Dept: GASTROENTEROLOGY | Facility: CLINIC | Age: 57
End: 2017-08-01

## 2017-08-01 NOTE — TELEPHONE ENCOUNTER
----- Message from Meño Bustamante MD sent at 8/1/2017  8:46 AM CDT -----  Please notify patient, the colon polyp was benign.

## 2017-08-03 ENCOUNTER — TELEPHONE (OUTPATIENT)
Dept: OBSTETRICS AND GYNECOLOGY | Facility: CLINIC | Age: 57
End: 2017-08-03

## 2017-08-03 ENCOUNTER — TELEPHONE (OUTPATIENT)
Dept: GASTROENTEROLOGY | Facility: CLINIC | Age: 57
End: 2017-08-03

## 2017-08-03 NOTE — TELEPHONE ENCOUNTER
----- Message from Mariposa Bartholomew sent at 8/2/2017  2:22 PM CDT -----  Contact: self - [phone]  Robby - returning your call re test results - please call patient at

## 2017-08-03 NOTE — TELEPHONE ENCOUNTER
----- Message from Melissa Mitchell MD sent at 7/31/2017 11:19 AM CDT -----  Please call patient. The bone density test shows osteopenia, which is thinning of the bones but not as severe as osteoporosis.  To improve the bone density, I recommend participating in weight-bearing exercise 2-3 times / week.  Also, please take calcium 1200 mg daily and Vitamin D 600 units daily unless your primary care physician has instructed you not to do so.   Repeat the bone density test in 3-5 years.

## 2017-08-04 ENCOUNTER — TELEPHONE (OUTPATIENT)
Dept: ENDOSCOPY | Facility: HOSPITAL | Age: 57
End: 2017-08-04

## 2018-12-14 ENCOUNTER — OFFICE VISIT (OUTPATIENT)
Dept: INTERNAL MEDICINE | Facility: CLINIC | Age: 58
End: 2018-12-14
Payer: COMMERCIAL

## 2018-12-14 VITALS
TEMPERATURE: 98 F | BODY MASS INDEX: 25.72 KG/M2 | SYSTOLIC BLOOD PRESSURE: 120 MMHG | HEART RATE: 84 BPM | WEIGHT: 160.06 LBS | HEIGHT: 66 IN | DIASTOLIC BLOOD PRESSURE: 88 MMHG

## 2018-12-14 DIAGNOSIS — J06.9 UPPER RESPIRATORY TRACT INFECTION, UNSPECIFIED TYPE: ICD-10-CM

## 2018-12-14 DIAGNOSIS — F17.200 TOBACCO DEPENDENCE: ICD-10-CM

## 2018-12-14 DIAGNOSIS — R68.89 FLU-LIKE SYMPTOMS: Primary | ICD-10-CM

## 2018-12-14 LAB
CTP QC/QA: YES
FLUAV AG NPH QL: NEGATIVE
FLUBV AG NPH QL: NEGATIVE

## 2018-12-14 PROCEDURE — 3008F BODY MASS INDEX DOCD: CPT | Mod: CPTII,S$GLB,, | Performed by: INTERNAL MEDICINE

## 2018-12-14 PROCEDURE — 87804 INFLUENZA ASSAY W/OPTIC: CPT | Mod: QW,S$GLB,, | Performed by: INTERNAL MEDICINE

## 2018-12-14 PROCEDURE — 99213 OFFICE O/P EST LOW 20 MIN: CPT | Mod: S$GLB,,, | Performed by: INTERNAL MEDICINE

## 2018-12-14 PROCEDURE — 99999 PR PBB SHADOW E&M-EST. PATIENT-LVL III: CPT | Mod: PBBFAC,,, | Performed by: INTERNAL MEDICINE

## 2018-12-14 RX ORDER — FLUTICASONE PROPIONATE 50 MCG
1 SPRAY, SUSPENSION (ML) NASAL DAILY PRN
Qty: 1 BOTTLE | Refills: 0 | Status: SHIPPED | OUTPATIENT
Start: 2018-12-14

## 2018-12-14 RX ORDER — BENZONATATE 100 MG/1
100 CAPSULE ORAL 3 TIMES DAILY PRN
Qty: 30 CAPSULE | Refills: 0 | Status: SHIPPED | OUTPATIENT
Start: 2018-12-14 | End: 2018-12-24

## 2018-12-14 RX ORDER — MEMANTINE HYDROCHLORIDE 5 MG/1
5 TABLET ORAL 2 TIMES DAILY
COMMUNITY

## 2018-12-14 NOTE — PROGRESS NOTES
Pt. ID: Catia Campbell is a 58 y.o. female      Chief complaint:   Chief Complaint   Patient presents with    Cough     x 2 weeks    Nasal Congestion     x 2 weeks       HPI: pt. Here for cough and congestion for past 1 week; she has had flu shot and she would like flu screen; she smokes 1/2 PPD and does not want smoking cessation    Review of Systems   Constitutional: Negative for chills and fever.   HENT: Positive for congestion. Negative for sore throat.    Respiratory: Positive for cough. Negative for shortness of breath.    Cardiovascular: Negative for chest pain.   Gastrointestinal: Negative for abdominal pain, nausea and vomiting.   Genitourinary: Negative for dysuria.         Objective:    Physical Exam   Constitutional: She is oriented to person, place, and time.   HENT:   Mouth/Throat: Oropharynx is clear and moist.   Eyes: EOM are normal.   Neck: Normal range of motion.   Cardiovascular: Normal rate, regular rhythm and normal heart sounds.   Pulmonary/Chest: Effort normal and breath sounds normal. No respiratory distress. She has no wheezes. She has no rales.   Abdominal: Soft. There is no tenderness. There is no rebound and no guarding.   Musculoskeletal: Normal range of motion.   Neurological: She is alert and oriented to person, place, and time.   Skin: No rash noted.   Vitals reviewed.        Health Maintenance   Topic Date Due    Mammogram  03/03/2017    Pap Smear with HPV Cotest  01/21/2018    Influenza Vaccine  08/01/2018    Lipid Panel  07/25/2022    TETANUS VACCINE  07/25/2027    Colonoscopy  07/28/2027    Hepatitis C Screening  Completed    Pneumococcal Vaccine (Medium Risk)  Completed         Assessment:     1. Flu-like symptoms Active   2. Upper respiratory tract infection, unspecified type Active   3. Tobacco dependence Sub-optimally controlled         Plan: Flu-like symptoms  Comments:  get flu screen and tyelenol prn   Orders:  -     POCT Influenza A/B    Upper respiratory  tract infection, unspecified type  Comments:  start flonase and tessalon prn; asked pt. to contact me if no better by early next week for antbx   Orders:  -     benzonatate (TESSALON) 100 MG capsule; Take 1 capsule (100 mg total) by mouth 3 (three) times daily as needed for Cough.  Dispense: 30 capsule; Refill: 0  -     fluticasone (FLONASE) 50 mcg/actuation nasal spray; 1 spray (50 mcg total) by Each Nare route daily as needed for Rhinitis.  Dispense: 1 Bottle; Refill: 0    Tobacco dependence  Comments:  encouraged cessation and explained risks         Problem List Items Addressed This Visit        ENT    Upper respiratory tract infection    Relevant Medications    benzonatate (TESSALON) 100 MG capsule    fluticasone (FLONASE) 50 mcg/actuation nasal spray       Other    Tobacco dependence    Flu-like symptoms - Primary    Relevant Orders    POCT Influenza A/B

## 2018-12-17 ENCOUNTER — TELEPHONE (OUTPATIENT)
Dept: FAMILY MEDICINE | Facility: CLINIC | Age: 58
End: 2018-12-17

## 2018-12-17 ENCOUNTER — TELEPHONE (OUTPATIENT)
Dept: INTERNAL MEDICINE | Facility: CLINIC | Age: 58
End: 2018-12-17

## 2018-12-17 DIAGNOSIS — J41.1 BRONCHITIS, MUCOPURULENT RECURRENT: Primary | ICD-10-CM

## 2018-12-17 RX ORDER — AZITHROMYCIN 250 MG/1
TABLET, FILM COATED ORAL
Qty: 6 TABLET | Refills: 0 | Status: SHIPPED | OUTPATIENT
Start: 2018-12-17 | End: 2018-12-22

## 2018-12-17 NOTE — TELEPHONE ENCOUNTER
----- Message from Jayshree Acevedo sent at 12/17/2018  7:12 AM CST -----  Contact: self/[phone]  Patient called to speak with the nurse about getting additional medication per Dr. Farah's advisement from the Friday visit that he would prescribe her some antibiotics (Z-harris) ASAP due to symptoms have not gotten better.    Please call and advise.

## 2021-03-20 ENCOUNTER — IMMUNIZATION (OUTPATIENT)
Dept: PRIMARY CARE CLINIC | Facility: CLINIC | Age: 61
End: 2021-03-20
Payer: COMMERCIAL

## 2021-03-20 DIAGNOSIS — Z23 NEED FOR VACCINATION: Primary | ICD-10-CM

## 2021-03-20 PROCEDURE — 0001A COVID-19, MRNA, LNP-S, PF, 30 MCG/0.3 ML DOSE VACCINE: ICD-10-PCS | Mod: CV19,S$GLB,, | Performed by: INTERNAL MEDICINE

## 2021-03-20 PROCEDURE — 91300 COVID-19, MRNA, LNP-S, PF, 30 MCG/0.3 ML DOSE VACCINE: CPT | Mod: S$GLB,,, | Performed by: INTERNAL MEDICINE

## 2021-03-20 PROCEDURE — 0001A COVID-19, MRNA, LNP-S, PF, 30 MCG/0.3 ML DOSE VACCINE: CPT | Mod: CV19,S$GLB,, | Performed by: INTERNAL MEDICINE

## 2021-03-20 PROCEDURE — 91300 COVID-19, MRNA, LNP-S, PF, 30 MCG/0.3 ML DOSE VACCINE: ICD-10-PCS | Mod: S$GLB,,, | Performed by: INTERNAL MEDICINE

## 2021-04-10 ENCOUNTER — IMMUNIZATION (OUTPATIENT)
Dept: PRIMARY CARE CLINIC | Facility: CLINIC | Age: 61
End: 2021-04-10
Payer: COMMERCIAL

## 2021-04-10 DIAGNOSIS — Z23 NEED FOR VACCINATION: Primary | ICD-10-CM

## 2021-04-10 PROCEDURE — 91300 COVID-19, MRNA, LNP-S, PF, 30 MCG/0.3 ML DOSE VACCINE: CPT | Mod: S$GLB,,, | Performed by: INTERNAL MEDICINE

## 2021-04-10 PROCEDURE — 0002A COVID-19, MRNA, LNP-S, PF, 30 MCG/0.3 ML DOSE VACCINE: ICD-10-PCS | Mod: CV19,S$GLB,, | Performed by: INTERNAL MEDICINE

## 2021-04-10 PROCEDURE — 91300 COVID-19, MRNA, LNP-S, PF, 30 MCG/0.3 ML DOSE VACCINE: ICD-10-PCS | Mod: S$GLB,,, | Performed by: INTERNAL MEDICINE

## 2021-04-10 PROCEDURE — 0002A COVID-19, MRNA, LNP-S, PF, 30 MCG/0.3 ML DOSE VACCINE: CPT | Mod: CV19,S$GLB,, | Performed by: INTERNAL MEDICINE
